# Patient Record
Sex: MALE | Race: WHITE
[De-identification: names, ages, dates, MRNs, and addresses within clinical notes are randomized per-mention and may not be internally consistent; named-entity substitution may affect disease eponyms.]

---

## 2021-08-07 ENCOUNTER — HOSPITAL ENCOUNTER (EMERGENCY)
Dept: HOSPITAL 7 - FB.ED | Age: 26
Discharge: HOME | End: 2021-08-07
Payer: COMMERCIAL

## 2021-08-07 DIAGNOSIS — L03.116: Primary | ICD-10-CM

## 2021-08-07 PROCEDURE — 99283 EMERGENCY DEPT VISIT LOW MDM: CPT

## 2021-08-07 NOTE — EDM.PDOC
ED HPI GENERAL MEDICAL PROBLEM





- General


Chief Complaint: Skin Complaint


Stated Complaint: SPIDER BITE


Time Seen by Provider: 08/07/21 22:40


Source of Information: Reports: Patient


History Limitations: Reports: No Limitations





- History of Present Illness


INITIAL COMMENTS - FREE TEXT/NARRATIVE: 


Cesar complains of red area other foot ankle since yesterday. No 

trauma,worried about a spider bite. Pain and redness,no systemic symptoms. 

Otherwise healthy with no active medical problems.


  ** Left Ankle


Pain Score (Numeric/FACES): 2





- Related Data


                                    Allergies











Allergy/AdvReac Type Severity Reaction Status Date / Time


 


No Known Allergies Allergy   Verified 05/18/15 14:09











Home Meds: 


                                    Home Meds





NK [No Known Home Meds]  08/07/21 [History]











Past Medical History





- Past Health History


Medical/Surgical History: Denies Medical/Surgical History





Social & Family History





- Family History


Family Medical History: No Pertinent Family History





- Tobacco Use


Tobacco Use Status *Q: Never Tobacco User





- Caffeine Use


Caffeine Use: Reports: None





- Recreational Drug Use


Recreational Drug Use: No





ED ROS GENERAL





- Review of Systems


Review Of Systems: Comprehensive ROS is negative, except as noted in HPI.





ED EXAM, SKIN/RASH


Exam: See Below


Text/Narrative:: 


THere is a blister on the right ankle,about 5 mm. Red base,with mild edema and 

induration of surrounding tissue ,which is also warm to touch.


Exam Limited By: No Limitations


General Appearance: Alert, WD/WN





Course





- Vital Signs


Last Recorded V/S: 


                                Last Vital Signs











Temp  98.2 F   08/07/21 22:28


 


Pulse  58 L  08/07/21 22:28


 


Resp  18   08/07/21 22:28


 


BP  135/84   08/07/21 22:28


 


Pulse Ox  98   08/07/21 22:28














Departure





- Departure


Time of Disposition: 22:42


Disposition: Home, Self-Care 01


Condition: Good


Clinical Impression: 


 Cellulitis








- Discharge Information


Instructions:  Insect Bite, Adult, Easy-to-Read


Referrals: 


Joaquin Wilson MD [Primary Care Provider] - 3 Days


Forms:  ED Department Discharge


Additional Instructions: 


doxycycline 100mg take 1 tab by mouth twice a day.





Sepsis Event Note (ED)





- Evaluation


Sepsis Screening Result: No Definite Risk





- Focused Exam


Vital Signs: 


                                   Vital Signs











  Temp Pulse Resp BP Pulse Ox


 


 08/07/21 22:28  98.2 F  58 L  18  135/84  98














- Problem List & Annotations


(1) Bug bite


SNOMED Code(s): 046247147, 050390706


   Code(s): W57.XXXA - BIT/STUNG BY NONVENOM INSECT & OTH NONVENOM ARTHROPODS, 

INIT   Status: Acute   


Qualifiers: 


   Encounter type: initial encounter   Qualified Code(s): W57.XXXA - Bitten or 

stung by nonvenomous insect and other nonvenomous arthropods, initial encounter 

 





- Problem List Review


Problem List Initiated/Reviewed/Updated: Yes





- Assessment/Plan


Plan: 


Warm compress. Doxycycline 100 mg po bid. Follow up in 1-2 days